# Patient Record
Sex: MALE | Race: WHITE
[De-identification: names, ages, dates, MRNs, and addresses within clinical notes are randomized per-mention and may not be internally consistent; named-entity substitution may affect disease eponyms.]

---

## 2020-07-19 ENCOUNTER — HOSPITAL ENCOUNTER (EMERGENCY)
Dept: HOSPITAL 63 - ER | Age: 65
Discharge: HOME | End: 2020-07-19
Payer: MEDICARE

## 2020-07-19 VITALS
WEIGHT: 253.53 LBS | DIASTOLIC BLOOD PRESSURE: 81 MMHG | BODY MASS INDEX: 34.34 KG/M2 | SYSTOLIC BLOOD PRESSURE: 176 MMHG | HEIGHT: 72 IN

## 2020-07-19 DIAGNOSIS — I50.9: ICD-10-CM

## 2020-07-19 DIAGNOSIS — Z88.5: ICD-10-CM

## 2020-07-19 DIAGNOSIS — Z88.7: ICD-10-CM

## 2020-07-19 DIAGNOSIS — I11.0: ICD-10-CM

## 2020-07-19 DIAGNOSIS — Z88.2: ICD-10-CM

## 2020-07-19 DIAGNOSIS — I48.91: ICD-10-CM

## 2020-07-19 DIAGNOSIS — M11.262: ICD-10-CM

## 2020-07-19 DIAGNOSIS — M25.562: Primary | ICD-10-CM

## 2020-07-19 PROCEDURE — 73562 X-RAY EXAM OF KNEE 3: CPT

## 2020-07-19 PROCEDURE — 99283 EMERGENCY DEPT VISIT LOW MDM: CPT

## 2020-07-19 NOTE — PHYS DOC
Past History


Past Medical History:  A-Fib, CHF, Hypertension, Other


Additional Past Medical Histor:  gout


Past Surgical History:  No Surgical History


Smoking:  Non-smoker


Alcohol Use:  Occasionally


Drug Use:  None





General Adult


EDM:


Chief Complaint:  KNEE INJURY





HPI:


HPI:





Patient is a [age] year old [sex] who presents with []





Review of Systems:


Review of Systems:





Constitutional: Denies fever or chills 


Eyes: Denies redness or eye pain 


HENT: Denies nasal congestion or sore throat


Respiratory: Denies cough or shortness of breath 


Cardiovascular: Denies chest pain or palpitations


GI: Denies abdominal pain, nausea, or vomiting


: Denies dysuria or hematuria


Musculoskeletal: Denies back pain or joint pain


Integument: Denies rash or skin lesions 


Neurologic: Denies headache, focal weakness or sensory changes





Complete systems were reviewed and found to be within normal limits, except as 

documented in this note.





Allergies:


Allergies:





Allergies








Coded Allergies Type Severity Reaction Last Updated Verified


 


  Sulfa (Sulfonamide Antibiotics) Allergy Intermediate  11/24/15 Yes


 


  aspirin Allergy Intermediate  11/24/15 Yes


 


  codeine Allergy Intermediate  11/24/15 Yes


 


  oxycodone Allergy Intermediate  11/24/15 Yes


 


  pneumococcal vaccine Allergy Intermediate Swelling 12/14/13 Yes


 


  celecoxib Allergy Mild leg swelling 12/14/13 Yes











Physical Exam:


PE:





Constitutional: Well developed, well nourished, no acute distress, non-toxic jennifer

earance


HENT: Normocephalic, atraumatic, oropharynx moist


Eyes: PERRL, EOMI, conjunctiva normal, no discharge


Neck: Normal range of motion, no tenderness, supple


Cardiovascular: Heart rate normal, regular rhythm


Lungs & Thorax:  Bilateral breath sounds clear to auscultation, no wheezing


Abdomen: Soft, no tenderness


Skin: Warm, dry, no erythema, no rash


Back: No tenderness, no CVA tenderness


Extremities: No tenderness, ROM intact, no edema


Neurologic: Alert and oriented X 3, normal motor function, normal sensory 

function, no focal deficits noted


Psychologic: Affect normal, judgment normal





EKG:


EKG:


[]





Radiology/Procedures:


Radiology/Procedures:


PROCEDURE: KNEE LEFT 3V





Examination: 3 views of the left knee


 


History history of left knee pain, stiffness


 


COMPARISON: None available.


 


FINDINGS:


 


Mild joint space loss identified in the medial, lateral, patellofemoral 


compartments. Chondrocalcinosis is identified in the medial, lateral 


compartments.


 


IMPRESSION:


 


1. Chondrocalcinosis.


 


Electronically signed by: Jhonny Chandra MD (7/19/2020 7:58 AM) [id number]





Course & Med Decision Making:


Course & Med Decision Making


Pertinent Labs and Imaging studies reviewed. (See chart for details)





Patient stable for discharge with outpatient follow-up with PCP/orthopedics.  

Orthopedic referral provided. Discussed findings and plan with patient, who 

acknowledges understanding and agreement.





Dragon Disclaimer:


Dragon Disclaimer:


This electronic medical record was generated, in whole or in part, using a voice

 recognition dictation system.





Departure


Departure:


Impression:  


   Primary Impression:  


   Knee pain, left


   Qualified Codes:  M25.562 - Pain in left knee


Disposition:  01 HOME/RESIDENCE PRIOR TO ADM


Condition:  STABLE


Referrals:  


CLOVER REYES MD (PCP)








CARO GAVIN MD


Patient Instructions:  Crutch Use, Easy-to-Read, Knee Pain, Easy-to-Read, Knee 

Wraps (Elastic Bandage) and RICE





Additional Instructions:  


Take over the counter Tylenol for any pain or discomfort.  Use crutches for 

support.





Justification of Admission:


Justification of Admission:


Justification of Admission Dx:  N/A











JACKIE STOREY DO             Jul 19, 2020 07:38

## 2020-07-19 NOTE — RAD
Examination: 3 views of the left knee

 

History history of left knee pain, stiffness

 

COMPARISON: None available.

 

FINDINGS:

 

Mild joint space loss identified in the medial, lateral, patellofemoral 

compartments. Chondrocalcinosis is identified in the medial, lateral 

compartments.

 

IMPRESSION:

 

1. Chondrocalcinosis.

 

Electronically signed by: Jhonny Chandra MD (7/19/2020 7:58 AM) [id number]